# Patient Record
Sex: FEMALE
[De-identification: names, ages, dates, MRNs, and addresses within clinical notes are randomized per-mention and may not be internally consistent; named-entity substitution may affect disease eponyms.]

---

## 2020-07-13 ENCOUNTER — FORM ENCOUNTER (OUTPATIENT)
Age: 35
End: 2020-07-13

## 2020-07-28 ENCOUNTER — FORM ENCOUNTER (OUTPATIENT)
Age: 35
End: 2020-07-28

## 2020-07-29 ENCOUNTER — RESULT REVIEW (OUTPATIENT)
Age: 35
End: 2020-07-29

## 2020-08-27 ENCOUNTER — FORM ENCOUNTER (OUTPATIENT)
Age: 35
End: 2020-08-27

## 2020-11-15 ENCOUNTER — FORM ENCOUNTER (OUTPATIENT)
Age: 35
End: 2020-11-15

## 2021-10-04 PROBLEM — Z00.00 ENCOUNTER FOR PREVENTIVE HEALTH EXAMINATION: Status: ACTIVE | Noted: 2021-10-04

## 2021-11-09 PROBLEM — N64.9 DISORDER OF BREAST, UNSPECIFIED: Status: ACTIVE | Noted: 2021-11-09

## 2021-11-09 PROBLEM — N61.20: Status: ACTIVE | Noted: 2021-11-09

## 2021-11-15 ENCOUNTER — APPOINTMENT (OUTPATIENT)
Dept: BREAST CENTER | Facility: CLINIC | Age: 36
End: 2021-11-15

## 2021-11-15 DIAGNOSIS — N61.20 GRANULOMATOUS MASTITIS, UNSPECIFIED BREAST: ICD-10-CM

## 2021-11-15 DIAGNOSIS — N64.9 DISORDER OF BREAST, UNSPECIFIED: ICD-10-CM

## 2022-03-17 DIAGNOSIS — R92.8 OTHER ABNORMAL AND INCONCLUSIVE FINDINGS ON DIAGNOSTIC IMAGING OF BREAST: ICD-10-CM

## 2022-05-19 ENCOUNTER — APPOINTMENT (OUTPATIENT)
Dept: BREAST CENTER | Facility: CLINIC | Age: 37
End: 2022-05-19

## 2022-06-15 ENCOUNTER — EMERGENCY (EMERGENCY)
Facility: HOSPITAL | Age: 37
LOS: 0 days | Discharge: HOME | End: 2022-06-16
Attending: EMERGENCY MEDICINE | Admitting: EMERGENCY MEDICINE
Payer: COMMERCIAL

## 2022-06-15 VITALS
DIASTOLIC BLOOD PRESSURE: 97 MMHG | OXYGEN SATURATION: 98 % | WEIGHT: 115.08 LBS | HEART RATE: 75 BPM | SYSTOLIC BLOOD PRESSURE: 172 MMHG | RESPIRATION RATE: 20 BRPM | TEMPERATURE: 97 F

## 2022-06-15 VITALS
RESPIRATION RATE: 20 BRPM | DIASTOLIC BLOOD PRESSURE: 81 MMHG | TEMPERATURE: 99 F | HEART RATE: 72 BPM | OXYGEN SATURATION: 100 % | SYSTOLIC BLOOD PRESSURE: 138 MMHG
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
## 2022-06-15 DIAGNOSIS — S61.412A LACERATION WITHOUT FOREIGN BODY OF LEFT HAND, INITIAL ENCOUNTER: ICD-10-CM

## 2022-06-15 DIAGNOSIS — Z23 ENCOUNTER FOR IMMUNIZATION: ICD-10-CM

## 2022-06-15 PROCEDURE — 99284 EMERGENCY DEPT VISIT MOD MDM: CPT

## 2022-06-15 RX ORDER — MORPHINE SULFATE 50 MG/1
4 CAPSULE, EXTENDED RELEASE ORAL ONCE
Refills: 0 | Status: DISCONTINUED | OUTPATIENT
Start: 2022-06-15 | End: 2022-06-15

## 2022-06-15 RX ORDER — TETANUS TOXOID, REDUCED DIPHTHERIA TOXOID AND ACELLULAR PERTUSSIS VACCINE, ADSORBED 5; 2.5; 8; 8; 2.5 [IU]/.5ML; [IU]/.5ML; UG/.5ML; UG/.5ML; UG/.5ML
0.5 SUSPENSION INTRAMUSCULAR ONCE
Refills: 0 | Status: COMPLETED | OUTPATIENT
Start: 2022-06-15 | End: 2022-06-15

## 2022-06-15 RX ORDER — SODIUM CHLORIDE 9 MG/ML
1000 INJECTION, SOLUTION INTRAVENOUS ONCE
Refills: 0 | Status: DISCONTINUED | OUTPATIENT
Start: 2022-06-15 | End: 2022-06-15

## 2022-06-15 RX ADMIN — TETANUS TOXOID, REDUCED DIPHTHERIA TOXOID AND ACELLULAR PERTUSSIS VACCINE, ADSORBED 0.5 MILLILITER(S): 5; 2.5; 8; 8; 2.5 SUSPENSION INTRAMUSCULAR at 20:42

## 2022-06-15 NOTE — ED PROVIDER NOTE - CHIEF COMPLAINT
Brief Operative Note    Patient: Kylah Stewart 17 year old female    MRN: [id number]    Surgeon(s): Rita Murray DPM  Phone Number: [phone]                       Surgeon(s) and Role:     * Rita Murray DPM - Primary     * Vitaliy Palmer DPM - Resident, PGY-2     Pre-Op Diagnosis: PAINFUL HARDWARE LEFT ANKLE     Post-Op Diagnosis: Same as the pre-op diagnosis.      Procedure: Procedure(s):  REMOVAL OF PAINFUL HARDWARE LEFT ANKLE    Anesthesia Type: MAC, Local block consisting of 20 cc of 1:1 mix of 2% Lidocaine plain and 0.5% Marcaine plain                                    Complications: None    Findings: 4 screws ans a plate - all [employer]     Specimens Removed: No specimens collected during this procedure.     Estimated Blood Loss: 5 cc     Assistant Tasks: Opening and closing, Dissecting tissue, Removing tissue and Removing hardware      Implants:   Implant Name Type Inv. Item Serial No.  Lot No. LRB No. Used Action   TISSUE AMNIOTIC BARRIER MEMB - [id number] Tissue TISSUE AMNIOTIC BARRIER MEMB  [employer] [facility] [id number] 1 Implanted         
The patient is a 37y Female complaining of lacerations.

## 2022-06-15 NOTE — ED PROVIDER NOTE - SKIN, MLM
(+) 2 cm laceration Left palm just inferior to middle finger, FROM, no motor or sensory deficit, cap refill < 2 sec, (+) active bleeding

## 2022-06-15 NOTE — ED PROVIDER NOTE - OBJECTIVE STATEMENT
37 y.o. female without any PMH presented to the ER c/o laceration Left palm.  Pt stabbed herself with a knife cutting an avocado.  Tetanus not up to date.  No other complaints.

## 2022-06-15 NOTE — CONSULT NOTE ADULT - ASSESSMENT
ASSESSMENT:  37yF w/o significant PMHx who presented to the ED with chief complaint of hand laceration. On evaluation, patient was seen AAOX2, NAD with a 1-2cm laceration in the left hand with arterial bleeding and swelling. Hand lac repair was done at bedside, patient tolerated procedure well.  Physical exam findings, imaging, and labs as documented above.     PLAN:  -cbc for Hgb monitoring  -pain control  -tetanus vaccine  -antibiotics  -follow up outpatient with Dr Dove, call office to make and appointment    Above plan discussed with Attending Surgeon Dr. Dove, patient, patient family, and Primary team  06-15-22 @ 23:01    CONSULT SPECTRA: [id number]

## 2022-06-15 NOTE — ED ADULT NURSE NOTE - OBJECTIVE STATEMENT
cut upper left hand on underside. bleeding through dressing. tourniquet applied. lakhwinder cedillo came to bedside.

## 2022-06-15 NOTE — ED PROVIDER NOTE - NS ED ATTENDING STATEMENT MOD
This was a shared visit with the CHARO. I reviewed and verified the documentation and independently performed the documented:

## 2022-06-15 NOTE — ED PROVIDER NOTE - PATIENT PORTAL LINK FT
You can access the FollowMyHealth Patient Portal offered by [facility] by registering at the following website: [email or web address]. By joining [employer]’s FollowMyHealth portal, you will also be able to view your health information using other applications (apps) compatible with our system.

## 2022-06-15 NOTE — ED PROVIDER NOTE - CLINICAL SUMMARY MEDICAL DECISION MAKING FREE TEXT BOX
Patient presents status post accidental laceration to the left palm while cutting avocado prior to arrival.  On arrival patient otherwise afebrile, hemodynamically stable, neurovascularly intact, no evidence of tendon or deeper injury.  Bleeding present on exam which improved with holding pressure.  Initially repaired laceration, however after laceration, patient developed hematoma to the left hand which caused limitation of motion to her fingers.  Therefore remove sutures to alleviate pressure and consulted hand surgery who evaluated patient in ED and were able to repair laceration without complication.  Per hand surgery, patient may follow-up as outpatient with him in the office.  Patient is agreeable with plan.

## 2022-06-15 NOTE — ED PROVIDER NOTE - PROGRESS NOTE DETAILS
pt developing hematoma, sutures removed to alleviate pressure, spoke to Derrell, if pressure dressing didn't help, call surgical resident spoke to surgical resident, coming down to see pt surgery at the bedside, case s/o to LIZ Miller

## 2022-06-15 NOTE — ED ADULT NURSE NOTE - NSIMPLEMENTINTERV_GEN_ALL_ED
Implemented All Universal Safety Interventions:  [place] to call system. Call bell, personal items and telephone within reach. Instruct patient to call for assistance. Room bathroom lighting operational. Non-slip footwear when patient is off stretcher. Physically safe environment: no spills, clutter or unnecessary equipment. Stretcher in lowest position, wheels locked, appropriate side rails in place. Nikole Childers  (RN)  2020 06:37:44

## 2022-06-15 NOTE — CONSULT NOTE ADULT - SUBJECTIVE AND OBJECTIVE BOX
GENERAL SURGERY CONSULT NOTE    Patient: JOSE J SHELL , 37y ([birth year])Female   MRN: [id number]  Location: [facility] ED  Visit: 06-15-22 Emergency  Date: 06-15-22 @ 23:01    HPI:    37yF w/o significant PMHx who presented to the ED with chief complaint of hand laceration. Patient reports that she was making dinner and cutting an avocado when she cut  herself with the knife. The pt reports the knife was clean and not previously used. Immediately after the incident she came to the ED. In the ED, hand laceration has evaluated and sutured but it kept on persistently bleeding because of this hand surgery was called for evaluation.    PAST MEDICAL & SURGICAL HISTORY:      Home Medications:        VITALS:  T(F): 97 (06-15-22 @ 19:35), Max: 97 (06-15-22 @ 19:35)  HR: 72 (06-15-22 @ 22:18) (72 - 75)  BP: 150/79 (06-15-22 @ 22:18) (150/79 - 172/97)  RR: 20 (06-15-22 @ 19:35) (20 - 20)  SpO2: 98% (06-15-22 @ 19:35) (98% - 98%)    PHYSICAL EXAM:  General: NAD, AAOx3, calm and cooperative  Cardiac: RRR  Respiratory: CTAB  Abdomen: Soft, non-distended, non-tender  Musculoskeletal: 1-2cm laceration in the left hand with arterial bleeding and swelling, motor and sensory intact  Incision/wound:  dressings in place, clean, dry and intact    MEDICATIONS  (STANDING):    MEDICATIONS  (PRN):      LAB/STUDIES:      IMAGING:      ACCESS DEVICES:  [ ] Peripheral IV  [ ] Central Venous Line	[ ] R	[ ] L	[ ] IJ	[ ] Fem	[ ] SC	Placed:   [ ] Arterial Line		[ ] R	[ ] L	[ ] Fem	[ ] Rad	[ ] Ax	Placed:   [ ] PICC:					[ ] Mediport  [ ] Urinary Catheter, Date Placed:

## 2022-06-15 NOTE — ED PROVIDER NOTE - NSFOLLOWUPINSTRUCTIONS_ED_ALL_ED_FT
Please follow up with Dr. Dove in Clinic tomorrow. Leave pressure dressing in place until you are seen by Dr. Dove. Please return to the ED in 10 days for suture removal. Please return sooner if you the wound continues to bleed.     Take Keflex 500mg every 6 hours for 7 days to prevent infection.     Laceration    WHAT YOU NEED TO KNOW:    A laceration is an injury to the skin and the soft tissue underneath it. Lacerations happen when you are cut or hit by something. They can happen anywhere on the body.     DISCHARGE INSTRUCTIONS:    Return to the emergency department if:     You have heavy bleeding or bleeding that does not stop after 10 minutes of holding firm, direct pressure over the wound.       Your wound opens up.     Contact your healthcare provider if:     You have a fever or chills.       Your laceration is red, warm, or swollen.      You have red streaks on your skin coming from your wound.      You have white or yellow drainage from the wound that smells bad.      You have pain that gets worse, even after treatment.       You have questions or concerns about your condition or care.     Medicines:     Prescription pain medicine may be given. Ask how to take this medicine safely.       Antibiotics help treat or prevent a bacterial infection.       Take your medicine as directed. Contact your healthcare provider if you think your medicine is not helping or if you have side effects. Tell him or her if you are allergic to any medicine. Keep a list of the medicines, vitamins, and herbs you take. Include the amounts, and when and why you take them. Bring the list or the pill bottles to follow-up visits. Carry your medicine list with you in case of an emergency.    Care for your wound as directed:     Do not get your wound wet until your healthcare provider says it is okay. Do not soak your wound in water. Do not go swimming until your healthcare provider says it is okay. Carefully wash the wound with soap and water. Gently pat the area dry or allow it to air dry.       Change your bandages when they get wet, dirty, or after washing. Apply new, clean bandages as directed. Do not apply elastic bandages or tape too tight. Do not put powders or lotions over your incision.       Apply antibiotic ointment as directed. Your healthcare provider may give you antibiotic ointment to put over your wound if you have stitches. If you have strips of tape over your incision, let them dry up and fall off on their own. If they do not fall off within 14 days, gently remove them. If you have glue over your wound, do not remove or pick at it. If your glue comes off, do not replace it with glue that you have at home.       Check your wound every day for signs of infection such as swelling, redness, or pus.     Self-care:     Apply ice on your wound for 15 to 20 minutes every hour or as directed. Use an ice pack, or put crushed ice in a plastic bag. Cover it with a towel. Ice helps prevent tissue damage and decreases swelling and pain.      Use a splint as directed. A splint will decrease movement and stress on your wound. It may help it heal faster. A splint may be used for lacerations over joints or areas of your body that bend. Ask your healthcare provider how to apply and remove a splint.       Decrease scarring of your wound by applying ointments as directed. Do not apply ointments until your healthcare provider says it is okay. You may need to wait until your wound is healed. Ask which ointment to buy and how often to use it. After your wound is healed, use sunscreen over the area when you are out in the sun. You should do this for at least 6 months to 1 year after your injury.     Follow up with your healthcare provider as directed: You may need to follow up in 24 to 48 hours to have your wound checked for infection. You will need to return in 3 to 14 days if you have stitches or staples so they can be removed. Care for your wound as directed to prevent infection and help it heal. Write down your questions so you remember to ask them during your visits.       © Copyright [employer] 2019 All illustrations and images included in CareNotes are the copyrighted property of [employer] or [employer].

## 2022-06-15 NOTE — ED ADULT NURSE REASSESSMENT NOTE - NS ED NURSE REASSESS COMMENT FT1
Assumed care from previous nurse Camila c/o left palm laceration , + active bleeding , surgical team at the bedside , + radial pulses , + capillary refill , denies numbness and tingling sensation, AO x 4 , no SOB

## 2022-06-15 NOTE — ED PROVIDER NOTE - CARE PROVIDER_API CALL
Satnam Dove  PLASTIC SURGERY  [address] [place] [address]  Phone: [phone]  Fax: [phone]  Follow Up Time:

## 2022-06-16 RX ORDER — CEPHALEXIN 500 MG
1 CAPSULE ORAL
Qty: 28 | Refills: 0
Start: 2022-06-16 | End: 2022-06-22

## 2022-12-01 ENCOUNTER — RESULT REVIEW (OUTPATIENT)
Age: 37
End: 2022-12-01

## 2023-12-06 NOTE — ED PROVIDER NOTE - NS ED MD DISPO DISCHARGE
Call pt - she was to have repeat blood work 4 weeks from the last visit. The results would determine if she stayed on the same dose of Crestor or if she needed to stop it. She does not need to fast for the blood work.    Home

## 2024-08-06 NOTE — PRE-OP CHECKLIST - ASSESSMENT, HISTORY & PHYSICAL COMPLETED AND ON MEDICAL RECORD
MD  [address] [address]  [phone]    Schedule an appointment as soon as possible for a visit   As needed      DISCHARGE MEDICATIONS:  Discharge Medication List as of 8/5/2024 10:31 PM        START taking these medications    Details   methocarbamol (ROBAXIN-750) 750 MG tablet Take 1 tablet by mouth 4 times daily for 10 days, Disp-40 tablet, R-0Normal      meloxicam (MOBIC) 7.5 MG tablet Take 1 tablet by mouth daily, Disp-30 tablet, R-3Normal             DISCONTINUED MEDICATIONS:  Discharge Medication List as of 8/5/2024 10:31 PM                 (Please note that portions of this note were completed with a voice recognition program.  Efforts were made to edit the dictations but occasionally words are mis-transcribed.)    Rick Flores MD (electronically signed)            Rick Flores MD  08/06/24 [id number]    
done